# Patient Record
Sex: FEMALE | Race: WHITE | NOT HISPANIC OR LATINO | Employment: OTHER | URBAN - METROPOLITAN AREA
[De-identification: names, ages, dates, MRNs, and addresses within clinical notes are randomized per-mention and may not be internally consistent; named-entity substitution may affect disease eponyms.]

---

## 2018-10-25 PROBLEM — G89.29 CHRONIC BILATERAL LOW BACK PAIN WITHOUT SCIATICA: Status: ACTIVE | Noted: 2018-10-25

## 2018-10-25 PROBLEM — M54.50 CHRONIC BILATERAL LOW BACK PAIN WITHOUT SCIATICA: Status: ACTIVE | Noted: 2018-10-25

## 2018-10-25 PROBLEM — Z00.00 ANNUAL PHYSICAL EXAM: Status: ACTIVE | Noted: 2018-10-25

## 2018-10-25 PROBLEM — M25.50 ARTHRALGIA: Status: ACTIVE | Noted: 2018-10-25

## 2018-10-25 PROBLEM — E55.9 VITAMIN D DEFICIENCY: Status: ACTIVE | Noted: 2018-10-25

## 2018-10-25 PROBLEM — E04.1 THYROID NODULE: Status: ACTIVE | Noted: 2018-10-25

## 2018-10-25 PROBLEM — N39.41 URGE INCONTINENCE OF URINE: Status: ACTIVE | Noted: 2018-10-25

## 2018-11-06 PROBLEM — N30.01 ACUTE CYSTITIS WITH HEMATURIA: Status: ACTIVE | Noted: 2018-11-06

## 2018-11-06 PROBLEM — R80.9 PROTEINURIA: Status: ACTIVE | Noted: 2018-11-06

## 2018-11-08 PROBLEM — E78.5 HYPERLIPIDEMIA: Status: ACTIVE | Noted: 2018-11-08

## 2018-11-09 PROBLEM — E04.1 THYROID NODULE: Status: RESOLVED | Noted: 2018-10-25 | Resolved: 2018-11-09

## 2019-01-28 PROBLEM — Z00.00 ANNUAL PHYSICAL EXAM: Status: RESOLVED | Noted: 2018-10-25 | Resolved: 2019-01-28

## 2019-02-25 PROBLEM — R80.9 PROTEINURIA: Status: RESOLVED | Noted: 2018-11-06 | Resolved: 2019-02-25

## 2020-01-20 PROBLEM — N30.01 ACUTE CYSTITIS WITH HEMATURIA: Status: RESOLVED | Noted: 2018-11-06 | Resolved: 2020-01-20

## 2020-04-28 PROBLEM — J45.20 MILD INTERMITTENT ASTHMA WITHOUT COMPLICATION: Status: ACTIVE | Noted: 2020-04-28

## 2022-03-16 PROBLEM — I65.21 STENOSIS OF RIGHT CAROTID ARTERY: Status: ACTIVE | Noted: 2022-03-16

## 2022-03-30 ENCOUNTER — OFFICE VISIT (OUTPATIENT)
Dept: CARDIOLOGY | Facility: CLINIC | Age: 77
End: 2022-03-30
Payer: COMMERCIAL

## 2022-03-30 VITALS
HEART RATE: 90 BPM | BODY MASS INDEX: 49.41 KG/M2 | HEIGHT: 63 IN | SYSTOLIC BLOOD PRESSURE: 140 MMHG | DIASTOLIC BLOOD PRESSURE: 100 MMHG | WEIGHT: 278.88 LBS | OXYGEN SATURATION: 99 %

## 2022-03-30 DIAGNOSIS — E66.01 MORBID OBESITY: ICD-10-CM

## 2022-03-30 DIAGNOSIS — G45.9 TIA (TRANSIENT ISCHEMIC ATTACK): ICD-10-CM

## 2022-03-30 DIAGNOSIS — I35.1 AORTIC VALVE INSUFFICIENCY, ETIOLOGY OF CARDIAC VALVE DISEASE UNSPECIFIED: Primary | ICD-10-CM

## 2022-03-30 DIAGNOSIS — I10 PRIMARY HYPERTENSION: ICD-10-CM

## 2022-03-30 DIAGNOSIS — R73.02 IGT (IMPAIRED GLUCOSE TOLERANCE): ICD-10-CM

## 2022-03-30 DIAGNOSIS — E78.5 HYPERLIPIDEMIA, UNSPECIFIED HYPERLIPIDEMIA TYPE: ICD-10-CM

## 2022-03-30 PROCEDURE — 99999 PR PBB SHADOW E&M-EST. PATIENT-LVL IV: CPT | Mod: PBBFAC,,, | Performed by: STUDENT IN AN ORGANIZED HEALTH CARE EDUCATION/TRAINING PROGRAM

## 2022-03-30 PROCEDURE — 1126F AMNT PAIN NOTED NONE PRSNT: CPT | Mod: CPTII,S$GLB,, | Performed by: STUDENT IN AN ORGANIZED HEALTH CARE EDUCATION/TRAINING PROGRAM

## 2022-03-30 PROCEDURE — 99204 PR OFFICE/OUTPT VISIT, NEW, LEVL IV, 45-59 MIN: ICD-10-PCS | Mod: S$GLB,,, | Performed by: STUDENT IN AN ORGANIZED HEALTH CARE EDUCATION/TRAINING PROGRAM

## 2022-03-30 PROCEDURE — 99204 OFFICE O/P NEW MOD 45 MIN: CPT | Mod: S$GLB,,, | Performed by: STUDENT IN AN ORGANIZED HEALTH CARE EDUCATION/TRAINING PROGRAM

## 2022-03-30 PROCEDURE — 1159F MED LIST DOCD IN RCRD: CPT | Mod: CPTII,S$GLB,, | Performed by: STUDENT IN AN ORGANIZED HEALTH CARE EDUCATION/TRAINING PROGRAM

## 2022-03-30 PROCEDURE — 1159F PR MEDICATION LIST DOCUMENTED IN MEDICAL RECORD: ICD-10-PCS | Mod: CPTII,S$GLB,, | Performed by: STUDENT IN AN ORGANIZED HEALTH CARE EDUCATION/TRAINING PROGRAM

## 2022-03-30 PROCEDURE — 3080F PR MOST RECENT DIASTOLIC BLOOD PRESSURE >= 90 MM HG: ICD-10-PCS | Mod: CPTII,S$GLB,, | Performed by: STUDENT IN AN ORGANIZED HEALTH CARE EDUCATION/TRAINING PROGRAM

## 2022-03-30 PROCEDURE — 99999 PR PBB SHADOW E&M-EST. PATIENT-LVL IV: ICD-10-PCS | Mod: PBBFAC,,, | Performed by: STUDENT IN AN ORGANIZED HEALTH CARE EDUCATION/TRAINING PROGRAM

## 2022-03-30 PROCEDURE — 3077F PR MOST RECENT SYSTOLIC BLOOD PRESSURE >= 140 MM HG: ICD-10-PCS | Mod: CPTII,S$GLB,, | Performed by: STUDENT IN AN ORGANIZED HEALTH CARE EDUCATION/TRAINING PROGRAM

## 2022-03-30 PROCEDURE — 1126F PR PAIN SEVERITY QUANTIFIED, NO PAIN PRESENT: ICD-10-PCS | Mod: CPTII,S$GLB,, | Performed by: STUDENT IN AN ORGANIZED HEALTH CARE EDUCATION/TRAINING PROGRAM

## 2022-03-30 PROCEDURE — 3077F SYST BP >= 140 MM HG: CPT | Mod: CPTII,S$GLB,, | Performed by: STUDENT IN AN ORGANIZED HEALTH CARE EDUCATION/TRAINING PROGRAM

## 2022-03-30 PROCEDURE — 3080F DIAST BP >= 90 MM HG: CPT | Mod: CPTII,S$GLB,, | Performed by: STUDENT IN AN ORGANIZED HEALTH CARE EDUCATION/TRAINING PROGRAM

## 2022-03-30 NOTE — PROGRESS NOTES
Section of Cardiology                  Cardiac Clinic Note    Chief Complaint/Reason for consultation:  Aortic regurgitation      HPI:   Valentine Fulton is a 76 y.o. female with h/o obesity, hypertension, HLD, carotid artery stenosis, prediabetes, chronic back pain, asthma who comes into Cardiology Clinic as referral by PCP Dr. Daniel for evaluation of aortic regurgitation.        3/30/22  Reports about 3 weeks having issues with talking which improved after drinking water  Recent found to have UTI, received abx  LE swelling, stable  Diet: trying to watch what she eats, mostly cut the carbs   Does not exercise regularly due to knee pain  ETOH occ. Stopped smoking 1990, smoked for 20+ years.   Family hx: dad and mom- heart disease   Denies chest pain, palpitations, syncope, PND, orthopnea      EKG 3/14/2022 NSR, cannot exclude septal infarct,     ECHO  3/22  Normal EF, mild to moderate aortic regurg, sclerosis of the aortic valve  Mild MR  Mild SD  Mild TR    STRESS TEST    [facility]      ROS: All 10 systems reviewed. Please refer to the HPI for pertinent positives. All other systems negative.     Past Medical History  Past Medical History:   Diagnosis Date    Arthritis     Asthma     Hyperlipidemia     Hypertension     Hypothyroidism     IGT (impaired glucose tolerance)     Morbid obesity     Osteoarthritis     knees and back    Thyroid disease     Thyroid nodule     Wears dentures     UPPER    Wears glasses        Surgical History  Past Surgical History:   Procedure Laterality Date    DILATION AND CURETTAGE OF UTERUS      JOINT REPLACEMENT      right    THYROIDECTOMY, PARTIAL      TOTAL KNEE ARTHROPLASTY Right           Allergies:   Review of patient's allergies indicates:  No Known Allergies    Social History:  Social History     Socioeconomic History    Marital status:    Tobacco Use    Smoking status: Former Smoker     Packs/day: 0.50     Years: 30.00     Pack years: 15.00  "    Quit date: 1990     Years since quittin.2    Smokeless tobacco: Never Used   Substance and Sexual Activity    Alcohol use: Yes     Comment: RARELY    Drug use: No       Family History:  family history includes Cancer in her sister; Heart disease in her father and mother; Hypertension in her daughter, father, mother, and son; Kidney disease in her son; Scleroderma in her son.    Home Medications:  Current Outpatient Medications on File Prior to Visit   Medication Sig Dispense Refill    aspirin 325 MG tablet Take 325 mg by mouth once daily.      cholecalciferol, vitamin D3, (VITAMIN D3) 250 mcg (10,000 unit) Cap Take 3 capsules (30,000 Units total) by mouth once a week. 36 capsule 1    levothyroxine (SYNTHROID) 88 MCG tablet Take 1 tablet (88 mcg total) by mouth once daily. 90 tablet 1    nitrofurantoin, macrocrystal-monohydrate, (MACROBID) 100 MG capsule Take 1 capsule (100 mg total) by mouth 2 (two) times daily. 14 capsule 0    atorvastatin (LIPITOR) 20 MG tablet Take 1 tablet (20 mg total) by mouth once daily. 90 tablet 1    SITagliptin (JANUVIA) 50 MG Tab Take 1 tablet (50 mg total) by mouth once daily. 90 tablet 1     No current facility-administered medications on file prior to visit.       Physical exam:  BP (!) 140/100 (BP Location: Right arm, Patient Position: Sitting, BP Method: Large (Manual))   Pulse 90   Ht 5' 3" (1.6 m)   Wt 126.5 kg (278 lb 14.1 oz)   SpO2 99%   BMI 49.40 kg/m²         General: Pt is a 76 y.o. year old female who is AAOx3, in NAD, is pleasant, well nourished, looks stated age  HEENT: PERRL, EOMI, Oral mucosa pink & moist  CVS  No abnormal cardiac pulsations noted on inspection. JVP not raised. The apical impulse is normal on palpation, and is located in the left 5th intercostal space in the mid - clavicular line. No palpable thrills or abnormal pulsations noted. RR, S1 - S2 heard, no murmurs, rubs or gallops appreciated.   PUL : CTA B/L. No wheezes/crackles " heard   ABD : BS +, soft. No tenderness elicited   LE : No C/C/E. Distal Pulses palpable B/L         LABS:    Chemistry:   Lab Results   Component Value Date     03/11/2022    K 4.9 03/11/2022     03/11/2022    CO2 23 03/11/2022    BUN 15 03/11/2022    CREATININE 0.81 03/11/2022    CREATININE 0.7 10/29/2012    GLUCOSE 107 10/29/2012    CALCIUM 10.0 03/11/2022    CALCIUM 9.1 10/29/2012     Cardiac Markers: No results found for: CKTOTAL, CKMB, CKMBINDEX, TROPONINI  Cardiac Markers (Last 3): No results found for: CKTOTAL, CKMB, CKMBINDEX, TROPONINI  CBC:   Lab Results   Component Value Date    WBC 7.9 03/11/2022    WBC 8.4 07/08/2019    HGB 15.2 03/11/2022    HGB 13.8 07/08/2019    HGB 10.7 (L) 10/29/2012    HCT 46.9 (H) 03/11/2022    HCT 42.5 07/08/2019    MCV 92 03/11/2022    MCV 93 07/08/2019     03/11/2022     07/08/2019     Lipids:   Lab Results   Component Value Date    CHOL 308 (H) 03/11/2022    TRIG 111 03/11/2022    HDL 80 03/11/2022     Coagulation: No results found for: PT, INR, APTT        Assessment        1. Aortic valve insufficiency, etiology of cardiac valve disease unspecified    2. Morbid obesity    3. TIA (transient ischemic attack)    4. Hyperlipidemia, unspecified hyperlipidemia type    5. Primary hypertension    6. IGT (impaired glucose tolerance)         Plan:    Aortic regurgitation  No symptoms  Only mild-moderate  Will monitor     ?TIA  Placed on  mg by PCP    HLD   as of 3/22  Just placed on lipitor 20 mg daily     HTN  Elevated, normotensive on prior visits  Check at home  Recheck at next visit    Prediabetes/inuslin resistance  A1c 5.9  Continue therapy     Morbid obesity, BMI 45-49.9  Low salt, low fat diet  Exercise as tolerated, at least 30 minutes daily        This note was prepared using voice recognition system and is likely to have sound alike errors that may have been overlooked even after proofreading.     I have reviewed all pertinent chart  information.  Plans and recommendations have been formulated under my direct supervision. All questions answered and patient voiced understanding.   If symptoms persist go to the ED.    RTC in 6 months         Alex Carbone MD  Cardiology

## 2023-07-27 ENCOUNTER — OFFICE VISIT (OUTPATIENT)
Dept: CARDIOLOGY | Facility: CLINIC | Age: 78
End: 2023-07-27
Payer: MEDICARE

## 2023-07-27 VITALS
OXYGEN SATURATION: 98 % | BODY MASS INDEX: 46.64 KG/M2 | HEART RATE: 70 BPM | HEIGHT: 63 IN | SYSTOLIC BLOOD PRESSURE: 140 MMHG | DIASTOLIC BLOOD PRESSURE: 80 MMHG | WEIGHT: 263.25 LBS

## 2023-07-27 DIAGNOSIS — Z01.818 PREOP EXAMINATION: ICD-10-CM

## 2023-07-27 DIAGNOSIS — R94.31 ABNORMAL EKG: ICD-10-CM

## 2023-07-27 DIAGNOSIS — E03.9 ACQUIRED HYPOTHYROIDISM: ICD-10-CM

## 2023-07-27 DIAGNOSIS — I35.1 AORTIC VALVE INSUFFICIENCY, ETIOLOGY OF CARDIAC VALVE DISEASE UNSPECIFIED: ICD-10-CM

## 2023-07-27 DIAGNOSIS — E78.2 MIXED HYPERLIPIDEMIA: ICD-10-CM

## 2023-07-27 DIAGNOSIS — E66.01 MORBID OBESITY: ICD-10-CM

## 2023-07-27 DIAGNOSIS — Z86.73 HISTORY OF TRANSIENT ISCHEMIC ATTACK (TIA): ICD-10-CM

## 2023-07-27 DIAGNOSIS — I10 PRIMARY HYPERTENSION: Primary | ICD-10-CM

## 2023-07-27 PROCEDURE — 3077F SYST BP >= 140 MM HG: CPT | Mod: CPTII,S$GLB,, | Performed by: STUDENT IN AN ORGANIZED HEALTH CARE EDUCATION/TRAINING PROGRAM

## 2023-07-27 PROCEDURE — 93010 ELECTROCARDIOGRAM REPORT: CPT | Mod: S$GLB,,, | Performed by: INTERNAL MEDICINE

## 2023-07-27 PROCEDURE — 3288F FALL RISK ASSESSMENT DOCD: CPT | Mod: CPTII,S$GLB,, | Performed by: STUDENT IN AN ORGANIZED HEALTH CARE EDUCATION/TRAINING PROGRAM

## 2023-07-27 PROCEDURE — 93010 EKG 12-LEAD: ICD-10-PCS | Mod: S$GLB,,, | Performed by: INTERNAL MEDICINE

## 2023-07-27 PROCEDURE — 93005 ELECTROCARDIOGRAM TRACING: CPT

## 2023-07-27 PROCEDURE — 3079F DIAST BP 80-89 MM HG: CPT | Mod: CPTII,S$GLB,, | Performed by: STUDENT IN AN ORGANIZED HEALTH CARE EDUCATION/TRAINING PROGRAM

## 2023-07-27 PROCEDURE — 99214 PR OFFICE/OUTPT VISIT, EST, LEVL IV, 30-39 MIN: ICD-10-PCS | Mod: S$GLB,,, | Performed by: STUDENT IN AN ORGANIZED HEALTH CARE EDUCATION/TRAINING PROGRAM

## 2023-07-27 PROCEDURE — 1126F AMNT PAIN NOTED NONE PRSNT: CPT | Mod: CPTII,S$GLB,, | Performed by: STUDENT IN AN ORGANIZED HEALTH CARE EDUCATION/TRAINING PROGRAM

## 2023-07-27 PROCEDURE — 1101F PT FALLS ASSESS-DOCD LE1/YR: CPT | Mod: CPTII,S$GLB,, | Performed by: STUDENT IN AN ORGANIZED HEALTH CARE EDUCATION/TRAINING PROGRAM

## 2023-07-27 PROCEDURE — 99999 PR PBB SHADOW E&M-EST. PATIENT-LVL IV: ICD-10-PCS | Mod: PBBFAC,,, | Performed by: STUDENT IN AN ORGANIZED HEALTH CARE EDUCATION/TRAINING PROGRAM

## 2023-07-27 PROCEDURE — 3077F PR MOST RECENT SYSTOLIC BLOOD PRESSURE >= 140 MM HG: ICD-10-PCS | Mod: CPTII,S$GLB,, | Performed by: STUDENT IN AN ORGANIZED HEALTH CARE EDUCATION/TRAINING PROGRAM

## 2023-07-27 PROCEDURE — 1101F PR PT FALLS ASSESS DOC 0-1 FALLS W/OUT INJ PAST YR: ICD-10-PCS | Mod: CPTII,S$GLB,, | Performed by: STUDENT IN AN ORGANIZED HEALTH CARE EDUCATION/TRAINING PROGRAM

## 2023-07-27 PROCEDURE — 99999 PR PBB SHADOW E&M-EST. PATIENT-LVL IV: CPT | Mod: PBBFAC,,, | Performed by: STUDENT IN AN ORGANIZED HEALTH CARE EDUCATION/TRAINING PROGRAM

## 2023-07-27 PROCEDURE — 3288F PR FALLS RISK ASSESSMENT DOCUMENTED: ICD-10-PCS | Mod: CPTII,S$GLB,, | Performed by: STUDENT IN AN ORGANIZED HEALTH CARE EDUCATION/TRAINING PROGRAM

## 2023-07-27 PROCEDURE — 1126F PR PAIN SEVERITY QUANTIFIED, NO PAIN PRESENT: ICD-10-PCS | Mod: CPTII,S$GLB,, | Performed by: STUDENT IN AN ORGANIZED HEALTH CARE EDUCATION/TRAINING PROGRAM

## 2023-07-27 PROCEDURE — 1159F PR MEDICATION LIST DOCUMENTED IN MEDICAL RECORD: ICD-10-PCS | Mod: CPTII,S$GLB,, | Performed by: STUDENT IN AN ORGANIZED HEALTH CARE EDUCATION/TRAINING PROGRAM

## 2023-07-27 PROCEDURE — 3079F PR MOST RECENT DIASTOLIC BLOOD PRESSURE 80-89 MM HG: ICD-10-PCS | Mod: CPTII,S$GLB,, | Performed by: STUDENT IN AN ORGANIZED HEALTH CARE EDUCATION/TRAINING PROGRAM

## 2023-07-27 PROCEDURE — 1159F MED LIST DOCD IN RCRD: CPT | Mod: CPTII,S$GLB,, | Performed by: STUDENT IN AN ORGANIZED HEALTH CARE EDUCATION/TRAINING PROGRAM

## 2023-07-27 PROCEDURE — 99214 OFFICE O/P EST MOD 30 MIN: CPT | Mod: S$GLB,,, | Performed by: STUDENT IN AN ORGANIZED HEALTH CARE EDUCATION/TRAINING PROGRAM

## 2023-07-27 RX ORDER — ASPIRIN 81 MG/1
81 TABLET ORAL DAILY
COMMUNITY

## 2023-07-27 RX ORDER — ATORVASTATIN CALCIUM 20 MG/1
20 TABLET, FILM COATED ORAL DAILY
Qty: 30 TABLET | Refills: 6 | Status: SHIPPED | OUTPATIENT
Start: 2023-07-27 | End: 2024-07-26

## 2023-07-27 NOTE — PROGRESS NOTES
Section of Cardiology                  Cardiac Clinic Note    Chief Complaint/Reason for consultation:  Aortic regurgitation      HPI:   Valentine Fulton is a 77 y.o. female with h/o obesity, hypertension, HLD, carotid artery stenosis, prediabetes, chronic back pain, asthma who comes into Cardiology Clinic as referral by PCP Dr. Daniel for evaluation of aortic regurgitation.      3/30/22  Reports about 3 weeks having issues with talking which improved after drinking water  Recent found to have UTI, received abx  LE swelling, stable  Diet: trying to watch what she eats, mostly cut the carbs   Does not exercise regularly due to knee pain  ETOH occ. Stopped smoking 1990, smoked for 20+ years.   Family hx: dad and mom- heart disease   Denies chest pain, palpitations, syncope, PND, orthopnea      7/27/23  Going for epidural injection in spine  Will have conscious sedation   Cholesterol elevated, stopped liptor due to potential side effects   BP elevated, usually normotensive   Trying to exercise, but has back pain  Lost weight 278 lbs 3/22 ->263 lbs today  Works at the      Denies chest pain, palpitations, syncope, PND, orthopnea, syncope           EKG 7/27/23 NSR, cannot r/o anterior infarct   EKG 3/14/2022 NSR, cannot exclude septal infarct,     ECHO  3/22  Normal EF, mild to moderate aortic regurg, sclerosis of the aortic valve  Mild MR  Mild MN  Mild TR    STRESS TEST    [facility]      ROS: All 10 systems reviewed. Please refer to the HPI for pertinent positives. All other systems negative.     Past Medical History  Past Medical History:   Diagnosis Date    Arthritis     Asthma     Hyperlipidemia     Hypertension     Hypothyroidism     IGT (impaired glucose tolerance)     Morbid obesity     Osteoarthritis     knees and back    Thyroid disease     Thyroid nodule     Wears dentures     UPPER    Wears glasses        Surgical History  Past Surgical History:   Procedure Laterality Date    DILATION  "AND CURETTAGE OF UTERUS      JOINT REPLACEMENT      right    THYROIDECTOMY, PARTIAL      TOTAL KNEE ARTHROPLASTY Right           Allergies:   Review of patient's allergies indicates:  No Known Allergies    Social History:  Social History     Socioeconomic History    Marital status:    Tobacco Use    Smoking status: Former     Packs/day: 0.50     Years: 30.00     Pack years: 15.00     Types: Cigarettes     Quit date: 1990     Years since quittin.5    Smokeless tobacco: Never   Substance and Sexual Activity    Alcohol use: Yes     Comment: RARELY    Drug use: No       Family History:  family history includes Cancer in her sister; Heart disease in her father and mother; Hypertension in her daughter, father, mother, and son; Kidney disease in her son; Scleroderma in her son.    Home Medications:  Current Outpatient Medications on File Prior to Visit   Medication Sig Dispense Refill    aspirin (ECOTRIN) 81 MG EC tablet Take 81 mg by mouth once daily.      ergocalciferol, vitamin D2, 10 mcg (400 unit) Tab Take 5,000 Units by mouth.      fluticasone-umeclidin-vilanter (TRELEGY ELLIPTA) 100-62.5-25 mcg DsDv Inhale into the lungs.      levothyroxine (SYNTHROID) 88 MCG tablet TAKE 1 TABLET (88 MCG TOTAL) BY MOUTH ONCE DAILY. 90 tablet 1    TRELEGY ELLIPTA 200-62.5-25 mcg inhaler       [DISCONTINUED] aspirin 325 MG tablet Take 325 mg by mouth once daily.       No current facility-administered medications on file prior to visit.       Physical exam:  BP (!) 140/80 (BP Location: Left arm, Patient Position: Sitting, BP Method: Large (Manual))   Pulse 70   Ht 5' 3" (1.6 m)   Wt 119.4 kg (263 lb 3.7 oz)   SpO2 98%   BMI 46.63 kg/m²         General: Pt is a 77 y.o. year old female who is AAOx3, in NAD, is pleasant, well nourished, looks stated age  HEENT: PERRL, EOMI, Oral mucosa pink & moist  CVS  No abnormal cardiac pulsations noted on inspection. JVP not raised. The apical impulse is normal on palpation, and " is located in the left 5th intercostal space in the mid - clavicular line. No palpable thrills or abnormal pulsations noted. RR, S1 - S2 heard, no murmurs, rubs or gallops appreciated.   PUL : CTA B/L. No wheezes/crackles heard   ABD : BS +, soft. No tenderness elicited   LE : No C/C/E. Distal Pulses palpable B/L         LABS:    Chemistry:   Lab Results   Component Value Date     07/19/2023    K 4.2 07/19/2023     07/19/2023    CO2 23 07/19/2023    BUN 14 07/19/2023    CREATININE 0.62 07/19/2023    CREATININE 0.7 10/29/2012    GLUCOSE 107 10/29/2012    CALCIUM 9.6 07/19/2023    CALCIUM 9.1 10/29/2012     Cardiac Markers: No results found for: CKTOTAL, CKMB, CKMBINDEX, TROPONINI  Cardiac Markers (Last 3): No results found for: CKTOTAL, CKMB, CKMBINDEX, TROPONINI  CBC:   Lab Results   Component Value Date    WBC 8.4 07/19/2023    WBC 7.5 01/14/2021    WBC 8.4 07/08/2019    HGB 13.8 07/19/2023    HGB 13.6 01/14/2021    HGB 13.8 07/08/2019    HGB 10.7 (L) 10/29/2012    HCT 42.5 07/19/2023    HCT 41.8 01/14/2021    HCT 42.5 07/08/2019    MCV 93 07/19/2023    MCV 93 07/08/2019     07/19/2023     01/14/2021     07/08/2019     Lipids:   Lab Results   Component Value Date    CHOL 268 (H) 07/19/2023    TRIG 71 07/19/2023    HDL 82 07/19/2023     Coagulation:   Lab Results   Component Value Date    INR CANCELED 07/19/2023    APTT CANCELED 07/19/2023           Assessment        1. Primary hypertension    2. Preop examination    3. Abnormal EKG    4. Mixed hyperlipidemia    5. Morbid obesity    6. Acquired hypothyroidism    7. Aortic valve insufficiency, etiology of cardiac valve disease unspecified    8. History of transient ischemic attack (TIA)         Plan:    Preop risk stratification  Moderate CV risk for epidural procedure   No further cardiac workup needed   [facility], Dr. Asher Lopez    Aortic regurgitation  No symptoms  Only mild-moderate  Repeat echo      ?TIA  Continue  ASA 81 mg mg by PCP    HLD   as of 7/23  restart lipitor 20 mg daily - but says she will stop once cholesterol improves    HTN  Elevated, normotensive on prior visits  Recheck next visit    Hypothyroidism   Stable  Continue synthroid     Prediabetes/inuslin resistance  A1c 5.4  Continue therapy     Morbid obesity, BMI 45-49.9  Low salt, low fat diet  Exercise as tolerated, at least 30 minutes daily        This note was prepared using voice recognition system and is likely to have sound alike errors that may have been overlooked even after proofreading.     I have reviewed all pertinent chart information.  Plans and recommendations have been formulated under my direct supervision. All questions answered and patient voiced understanding.   If symptoms persist go to the ED.    RTC in 2 months         Alex Carbone MD  Cardiology

## 2023-08-01 PROBLEM — M85.852 OSTEOPENIA OF NECK OF LEFT FEMUR: Status: ACTIVE | Noted: 2023-08-01

## 2023-08-11 ENCOUNTER — TELEPHONE (OUTPATIENT)
Dept: CARDIOLOGY | Facility: CLINIC | Age: 78
End: 2023-08-11
Payer: MEDICARE

## 2023-08-11 NOTE — TELEPHONE ENCOUNTER
Called and spoke with pt regarding records needing to be faxed over for her upcoming procedure. Confirmed all records she needed faxed. Contacted pt once fax was confirmed to to let her know her records have been sent over.    ----- Message from Zoe Colon sent at 8/11/2023 11:21 AM CDT -----  Contact: Valentine  Patient is calling to speak with someone regarding EKG results, doctors notes, and clearance for an upcoming procedure. Patient request information be faxed to Dr. Asher Almonte to fax number [phone] today as requested. Please give patient a call back at [phone] when possible to notify when sent.  Thank you,  TH